# Patient Record
Sex: MALE | Race: OTHER | Employment: FULL TIME | URBAN - METROPOLITAN AREA
[De-identification: names, ages, dates, MRNs, and addresses within clinical notes are randomized per-mention and may not be internally consistent; named-entity substitution may affect disease eponyms.]

---

## 2020-01-28 ENCOUNTER — HOSPITAL ENCOUNTER (OUTPATIENT)
Dept: PHYSICAL THERAPY | Age: 65
End: 2020-01-28

## 2020-02-04 ENCOUNTER — HOSPITAL ENCOUNTER (OUTPATIENT)
Dept: PHYSICAL THERAPY | Age: 65
Discharge: HOME | End: 2020-02-04
Payer: COMMERCIAL

## 2020-02-04 PROCEDURE — 97530 THERAPEUTIC ACTIVITIES: CPT

## 2020-02-04 PROCEDURE — 97162 PT EVAL MOD COMPLEX 30 MIN: CPT

## 2020-02-04 NOTE — PROGRESS NOTES
PHYSICAL THERAPY - DAILY TREATMENT NOTE    Patient Name: Jarrett Carranza        Date: 2020  : 1955   YES Patient  Verified  Visit #:     Insurance: Payor: Hugo Watkins / Plan: [place] [employer] PPO / Product Type: PPO /      In time: 5:35 P Out time: 6:15 P   Total Treatment Time: 40     BCBS/Medicare Time Tracking (below)   Total Timed Codes (min):  NA 1:1 Treatment Time:  NA     TREATMENT AREA =  Low back pain [M54.5]  SUBJECTIVE  Pain Level (on 0 to 10 scale):  1  / 10   Medication Changes/New allergies or changes in medical history, any new surgeries or procedures?     NO    If yes, update Summary List   Subjective Functional Status/Changes:  []  No changes reported     See POC           Modalities Rationale:    Patient deferred   min [] Estim, type/location:                                      []  att     []  unatt     []  w/US     []  w/ice    []  w/heat    min []  Mechanical Traction: type/lbs                   []  pro   []  sup   []  int   []  cont    []  before manual    []  after manual    min []  Ultrasound, settings/location:      min []  Iontophoresis w/ dexamethasone, location:                                               []  take home patch       []  in clinic    min []  Ice     []  Heat    location/position:     min []  Vasopneumatic Device, press/temp:     min []  Other:    [] Skin assessment post-treatment (if applicable):    []  intact    []  redness- no adverse reaction     []redness  adverse reaction:        10 min Therapeutic Activity: [x]  See flow sheet   Rationale:    improve coordination and increase proprioception to improve the patients ability to return to pain-free sitting     Billed With/As:   [] TE   [] TA   [] Neuro   [] Self Care Patient Education: [x] Review HEP    [] Progressed/Changed HEP based on:   [] positioning   [] body mechanics   [] transfers   [] heat/ice application    [] other:      Other Objective/Functional Measures:    Pt educated on use of l/s roll in sitting & proper body mechanics with sit to stand transfers,      Post Treatment Pain Level (on 0 to 10) scale:   0  / 10     ASSESSMENT  Assessment/Changes in Function:     See POC     []  See Progress Note/Recertification   Patient will continue to benefit from skilled PT services to modify and progress therapeutic interventions, address functional mobility deficits, address ROM deficits, address strength deficits, assess and modify postural abnormalities and instruct in home and community integration to attain remaining goals.    Progress toward goals / Updated goals:    Progressing towards goals established at [address] [facility] [id number]  []  Upgrade activities as tolerated YES Continue plan of care   []  Discharge due to :    []  Other:      Therapist: Gabriela Grider PT    Date: 2/4/2020 Time: 6:22 PM     Future Appointments   Date Time Provider Dustin Lee   2/6/2020  5:30 PM Beryle Gray [facility]   2/11/2020  6:00 PM [place]a [place] [facility]   2/18/2020  5:30 PM [facility]   2/20/2020  6:00 PM [place]a [place] [facility]   2/25/2020  5:00 PM Beryle Gray [facility]   2/27/2020  5:30 PM [facility]

## 2020-02-04 NOTE — PROGRESS NOTES
Rasheeda Goldberg [address] [facility] [place] [facility] AT [facility] [address], [address] [address] ,[address], [address]  Phone: [phone]  Fax: [phone] / [address] [address] PHYSICAL THERAPY SERVICES  Patient Name: Zack De La Paz : 1955   Medical   Diagnosis: Low back pain [M54.5] Treatment Diagnosis: LBP   Onset Date: 4-5 mos ago     Referral Source: Chaim Ndiaye DO Start of Care [facility]): 2020   Prior Hospitalization: See medical history Provider #: [id number]   Prior Level of Function: Pain-free with ADLs & work activities   Comorbidities: H/o    Medications: Verified on Patient Summary List   The Plan of Care and following information is based on the information from the initial evaluation.   ==================================================================================  Assessment / key information:  Patient is a pleasant 60 y.o. male who presents to [facility] with LBP. Patient reports initial onset of sx 4-5 mos which were of unknown etiology. Current c/o pain are fairly constant in nature & worsen with activities such as prolonged sitting & rising from chairs & getting in/out of the car & is avoiding lifting. Sx improve with standing & walking. Average reported pain level at 3/10, 10/10 at worst & 1/10 at best.  Upon objective evaluation, patient demonstrates l/s ROM as follows 75% FIS, 50% ext, 75% B SB, all planes of motion pain-free with mild c/o pain in lower back with l/s curve reversal.  MMT revealed  5/5 LE strength, all dural tension signs were unremarkable. Slight weakness of B multifidus in prone, 4/5 with no c/o pain upon MMT. Poor sitting posture despite use of l/s roll in sitting, patient was educated on sitting posture & body mechanics with lifting at work as well as initial HEP.    Patient can benefit PT interventions to improve ROM, posture, decrease pain to facilitate return to unlimited ADLs, work activities & overall functional status.  ==================================================================================  Eval Complexity: History HIGH Complexity :3+ comorbidities / personal factors will impact the outcome/ POC ;  Examination  MEDIUM Complexity : 3 Standardized tests and measures addressing body structure, function, activity limitation and / or participation in recreation ; Presentation MEDIUM Complexity : Evolving with changing characteristics ; Decision Making MEDIUM Complexity : FOTO score of 26-74; Overall Complexity MEDIUM  Problem List: pain affecting function, decrease ROM, decrease strength, edema affecting function, decrease ADL/ functional abilitiies, decrease activity tolerance, decrease flexibility/ joint mobility, decrease transfer abilities and other FOTO 67 points   Treatment Plan may include any combination of the following: Therapeutic exercise, Therapeutic activities, Neuromuscular re-education, Physical agent/modality, Gait/balance training, Manual therapy, Aquatic therapy, Patient education, Self Care training, Functional mobility training and Home safety training  Patient / Family readiness to learn indicated by: asking questions, trying to perform skills and interest  Persons(s) to be included in education: patient (P)  Barriers to Learning/Limitations: None  Measures taken:    Patient Goal (s): \"to help with pain\"   Patient self reported health status: good  Rehabilitation Potential: good   Short Term Goals: To be accomplished in  2  weeks:  1) Establish HEP to prevent further disability. 2) Patient will report decreased c/o pain to < or = 1-2/10 to facilitate prolonged sitting with manageable sx in lower back. 3) Improve FOTO score from 67 points to > or = 70 points indicating improved tolerance with ADLs in regards to in lower back.   4) Patient will be able to demonstrate the appropriate sitting posture with or without use of l/s roll to facilitate sitting activities at [employer].  Long Term Goals: To be accomplished in  4  weeks:  1) Improve FOTO score from 70 points to > or = 76 points indicating improved tolerance with ADLs in regards to lower back. 2) Patient to report 75% improvement in overall function in preparation for return to recreational activities with manageable sx in lower back. 3) Patient will be able to demonstrate the appropriate body mechanics with lifting weighted box to prevent further injury for return to lifting at home/work. 4) Improve overall strength of B multifidus in prone to 5/5 with no c/o pain upon MMT so strength is available for return to pain-free work activities. Frequency / Duration:   Patient to be seen  2-3  times per week for 4  weeks:  Patient / Caregiver education and instruction: self care, activity modification, brace/ splint application and exercises    Therapist Signature: GABRIEL Durham, cert MDT Date: 5/2/7070   Certification Period: None Time: 5:37 PM   ==================================================================================  I certify that the above Physical Therapy Services are being furnished while the patient is under my care. I agree with the treatment plan and certify that this therapy is necessary. Physician Signature:        Date:       Time:     Please sign and return to [facility] or you may fax the signed copy to [phone]. Thank you.

## 2020-02-06 ENCOUNTER — HOSPITAL ENCOUNTER (OUTPATIENT)
Dept: PHYSICAL THERAPY | Age: 65
Discharge: HOME | End: 2020-02-06
Payer: COMMERCIAL

## 2020-02-06 PROCEDURE — 97110 THERAPEUTIC EXERCISES: CPT

## 2020-02-06 NOTE — PROGRESS NOTES
PHYSICAL THERAPY - DAILY TREATMENT NOTE    Patient Name: John Vazquez        Date: 2020  : 1955   YES Patient  Verified  Visit #:      of   12  Insurance: Payor: Nathanael Kaminski / Plan: [employer] PPO / Product Type: PPO /      In time: 5:25 P Out time: 6:10 P   Total Treatment Time: 40     BCBS/Medicare Time Tracking (below)   Total Timed Codes (min):  NA 1:1 Treatment Time:  NA     TREATMENT AREA = Low back pain [M54.5]    SUBJECTIVE  Pain Level (on 0 to 10 scale):  0  / 10   Medication Changes/New allergies or changes in medical history, any new surgeries or procedures? NO    If yes, update Summary List   Subjective Functional Status/Changes:  []  No changes reported     Patient reports he had some pain this morning and he almost took a pain pill. But he is feeling good right now.             Modalities Rationale:    Patient deferred   min [] Estim, type/location:                                      []  att     []  unatt     []  w/US     []  w/ice    []  w/heat    min []  Mechanical Traction: type/lbs                   []  pro   []  sup   []  int   []  cont    []  before manual    []  after manual    min []  Ultrasound, settings/location:      min []  Iontophoresis w/ dexamethasone, location:                                               []  take home patch       []  in clinic    min []  Ice     []  Heat    location/position:     min []  Vasopneumatic Device, press/temp:     min []  Other:    [] Skin assessment post-treatment (if applicable):    []  intact    []  redness- no adverse reaction     []redness  adverse reaction:        40 min Therapeutic Exercise:  [x]  See flow sheet   Rationale:      increase ROM and increase strength to improve the patients ability to return to pain-free standing     Billed With/As:   [] TE   [] TA   [] Neuro   [] Self Care Patient Education: [x] Review HEP    [] Progressed/Changed HEP based on:   [] positioning   [] body mechanics   [] transfers   [] heat/ice application    [] other:      Other Objective/Functional Measures:    Initiated therapeutic exercise per flow sheet     Post Treatment Pain Level (on 0 to 10) scale:   0  / 10     ASSESSMENT  Assessment/Changes in Function:     Good tolerance to all exercises today, no increase in pain, see updated HEP, patient educated on recreational fitness program including light weight training. []  See Progress Note/Recertification   Patient will continue to benefit from skilled PT services to modify and progress therapeutic interventions, address functional mobility deficits, address ROM deficits, address strength deficits, assess and modify postural abnormalities and instruct in home and community integration to attain remaining goals.    Progress toward goals / Updated goals:    Progressing towards newly established goals     PLAN  []  Upgrade activities as tolerated YES Continue plan of care   []  Discharge due to :    []  Other:      Therapist: Chago Crowley PT    Date: 2/6/2020 Time: 5:44 PM     Future Appointments   Date Time Provider Dustin Lee   2/11/2020  6:00 PM [facility]   2/18/2020  5:30 PM Kim ANDERS [facility]   2/20/2020  6:00 PM Irwin Peres PT [facility]   2/25/2020  5:00 PM [facility]   2/27/2020  5:30 PM Kim Conner [facility]

## 2020-02-11 ENCOUNTER — HOSPITAL ENCOUNTER (OUTPATIENT)
Dept: PHYSICAL THERAPY | Age: 65
Discharge: HOME | End: 2020-02-11
Payer: COMMERCIAL

## 2020-02-11 PROCEDURE — 97110 THERAPEUTIC EXERCISES: CPT

## 2020-02-18 ENCOUNTER — APPOINTMENT (OUTPATIENT)
Dept: PHYSICAL THERAPY | Age: 65
End: 2020-02-18
Payer: COMMERCIAL

## 2020-02-20 ENCOUNTER — HOSPITAL ENCOUNTER (OUTPATIENT)
Dept: PHYSICAL THERAPY | Age: 65
Discharge: HOME | End: 2020-02-20
Payer: COMMERCIAL

## 2020-02-20 PROCEDURE — 97110 THERAPEUTIC EXERCISES: CPT

## 2020-02-20 NOTE — PROGRESS NOTES
PHYSICAL THERAPY - DAILY TREATMENT NOTE    Patient Name: Jerry Foster        Date: 2020  : 1955   YES Patient  Verified  Visit #:     Insurance: Payor: Oliver Marshall / Plan: [place] [employer] PPO / Product Type: PPO /      In time: 5:20 P Out time: 6 P   Total Treatment Time: 40     BCBS/Medicare Time Tracking (below)   Total Timed Codes (min):  NA 1:1 Treatment Time:  NA     TREATMENT AREA = Low back pain [M54.5]    SUBJECTIVE  Pain Level (on 0 to 10 scale):  2  / 10   Medication Changes/New allergies or changes in medical history, any new surgeries or procedures? NO    If yes, update Summary List   Subjective Functional Status/Changes:  []  No changes reported     Patient reports he does not feel much different since his shot last week, 1 week ago today.             Modalities Rationale:    Patient deferred   min [] Estim, type/location:                                      []  att     []  unatt     []  w/US     []  w/ice    []  w/heat    min []  Mechanical Traction: type/lbs                   []  pro   []  sup   []  int   []  cont    []  before manual    []  after manual    min []  Ultrasound, settings/location:      min []  Iontophoresis w/ dexamethasone, location:                                               []  take home patch       []  in clinic    min []  Ice     []  Heat    location/position:     min []  Vasopneumatic Device, press/temp:     min []  Other:    [] Skin assessment post-treatment (if applicable):    []  intact    []  redness- no adverse reaction     []redness  adverse reaction:        40 min Therapeutic Exercise:  [x]  See flow sheet   Rationale:      increase ROM and increase strength to improve the patients ability to return to pain-free standing     Billed With/As:   [] TE   [] TA   [] Neuro   [] Self Care Patient Education: [x] Review HEP    [] Progressed/Changed HEP based on:   [] positioning   [] body mechanics   [] transfers   [] heat/ice application    [] other: Other Objective/Functional Measures:    Reviewed postural ed, hip hinge in standing as well as hip hinge with dowel, sit to stand from chair     Post Treatment Pain Level (on 0 to 10) scale:   0  / 10     ASSESSMENT  Assessment/Changes in Function:     No increase in pain with flexion based exercises today      []  See Progress Note/Recertification   Patient will continue to benefit from skilled PT services to modify and progress therapeutic interventions, address functional mobility deficits, address ROM deficits, address strength deficits, assess and modify postural abnormalities, address imbalance/dizziness and instruct in home and community integration to attain remaining goals.    Progress toward goals / Updated goals:    Progressing towards STG 2      PLAN  []  Upgrade activities as tolerated YES Continue plan of care   []  Discharge due to :    [x]  Other: Progress note n/v     Therapist: Gómez Metcalf PT    Date: 2/20/2020 Time: 6:02 PM     Future Appointments   Date Time Provider Dustin Lee   2/25/2020  5:00 PM Jazz Retana [facility] [place]   2/27/2020  5:30 PM Violette Henry [facility]

## 2020-02-25 ENCOUNTER — APPOINTMENT (OUTPATIENT)
Dept: PHYSICAL THERAPY | Age: 65
End: 2020-02-25
Payer: COMMERCIAL

## 2020-02-27 ENCOUNTER — APPOINTMENT (OUTPATIENT)
Dept: PHYSICAL THERAPY | Age: 65
End: 2020-02-27
Payer: COMMERCIAL

## 2020-04-03 NOTE — PROGRESS NOTES
Rasheeda Rolandkialfonso Goldberg [address]  [facility] [facility]  [facility]  Earnest Beck [facility], [address] [place] [address], [address]  Phone: [phone]  Fax: [phone] SUMMARY  Patient Name: Mounika Rodriguez : 1955   Treatment/Medical Diagnosis: Low back pain [M54.5]   Referral Source: Fani Telles DO     Date of Initial Visit: 20 Attended Visits: 4 Missed Visits: 0     SUMMARY OF TREATMENT  Therapeutic exercise including ROM, stretching, gentle strengthening, stabilization training, postural ed, patient education, HEP instruction. CURRENT STATUS  Mr. Anastasiya Jenkins was seen for 3 f/u visits, he was last seen for PT on 20. He had continued to report intermittent c/o LBP with pain level up to 8/10 at times after periods of heavy lifting & increased activity levels in general.  He had LULI on 20 & at final scheduled visit & had reported minimal to no improvement in symptoms. No further contact has been made with clinic to continue with PT, therefore patient to be discharged to home program.      Goal/Measure of Progress Goal Met? 1.  Establish HEP to prevent further disability. Status at last Eval: NA Current Status: HEP established  yes   2. Patient will report decreased c/o pain to < or = 1-2/10 to facilitate prolonged sitting with manageable sx in lower back. Status at last Eval: 3/10 average Current Status: 2/10 pain level reported at last Rx Partially met   3. Improve FOTO score from 67 points to > or = 70 points indicating improved tolerance with ADLs in regards to in lower back. Status at last Eval: 79 Current Status: Unable to be re-assessed n/a   4. Patient will be able to demonstrate the appropriate sitting posture with or without use of l/s roll to facilitate sitting activities at home/work.     Status at last Eval: NA Current Status: Good sitting posture with use of l/s roll, v/c for unsupported sitting posture Partially met RECOMMENDATIONS  Other: self DC     If you have any questions/comments please contact us directly at [phone]. Thank you for allowing us to assist in the care of your patient.     Therapist Signature: GABRIEL Foster, cert MDT Date: 5-05-89     Time: 3:36 PM